# Patient Record
Sex: FEMALE | URBAN - METROPOLITAN AREA
[De-identification: names, ages, dates, MRNs, and addresses within clinical notes are randomized per-mention and may not be internally consistent; named-entity substitution may affect disease eponyms.]

---

## 2021-12-06 PROBLEM — I10 HYPERTENSION: Status: ACTIVE | Noted: 2021-12-06

## 2021-12-06 PROBLEM — H91.93 BILATERAL HEARING LOSS: Status: ACTIVE | Noted: 2021-12-06

## 2021-12-06 PROBLEM — G31.84 MILD COGNITIVE IMPAIRMENT: Status: ACTIVE | Noted: 2021-12-06

## 2021-12-06 PROBLEM — E78.2 MIXED HYPERLIPIDEMIA: Status: ACTIVE | Noted: 2021-12-06

## 2021-12-06 PROBLEM — J45.909 ASTHMA: Status: ACTIVE | Noted: 2021-12-06

## 2021-12-06 PROBLEM — G47.33 OSA (OBSTRUCTIVE SLEEP APNEA): Status: ACTIVE | Noted: 2021-12-06

## 2021-12-06 PROBLEM — J44.9 COPD (CHRONIC OBSTRUCTIVE PULMONARY DISEASE) (HCC): Status: ACTIVE | Noted: 2021-12-06

## 2021-12-06 PROBLEM — K21.9 GERD (GASTROESOPHAGEAL REFLUX DISEASE): Status: ACTIVE | Noted: 2021-12-06

## 2021-12-07 ENCOUNTER — OFFICE VISIT (OUTPATIENT)
Dept: GERIATRIC MEDICINE | Age: 86
End: 2021-12-07
Payer: MEDICARE

## 2021-12-07 DIAGNOSIS — U07.1 COVID-19: Primary | ICD-10-CM

## 2021-12-07 DIAGNOSIS — I10 PRIMARY HYPERTENSION: ICD-10-CM

## 2021-12-07 DIAGNOSIS — G31.84 MILD COGNITIVE IMPAIRMENT: ICD-10-CM

## 2021-12-07 DIAGNOSIS — J42 CHRONIC BRONCHITIS, UNSPECIFIED CHRONIC BRONCHITIS TYPE (HCC): ICD-10-CM

## 2021-12-07 LAB
ALBUMIN SERPL-MCNC: 3.6 G/DL (ref 3.5–4.6)
ALP BLD-CCNC: 93 U/L (ref 40–130)
ALT SERPL-CCNC: 13 U/L (ref 0–33)
ANION GAP SERPL CALCULATED.3IONS-SCNC: 11 MEQ/L (ref 9–15)
AST SERPL-CCNC: 16 U/L (ref 0–35)
BILIRUB SERPL-MCNC: 0.9 MG/DL (ref 0.2–0.7)
BILIRUBIN DIRECT: <0.2 MG/DL (ref 0–0.4)
BILIRUBIN, INDIRECT: NORMAL MG/DL (ref 0–0.6)
BUN BLDV-MCNC: 15 MG/DL (ref 8–23)
C-REACTIVE PROTEIN: 9.7 MG/L (ref 0–5)
CALCIUM SERPL-MCNC: 9.1 MG/DL (ref 8.5–9.9)
CHLORIDE BLD-SCNC: 106 MEQ/L (ref 95–107)
CO2: 28 MEQ/L (ref 20–31)
CREAT SERPL-MCNC: 0.65 MG/DL (ref 0.5–0.9)
D DIMER: 1.04 MG/L FEU (ref 0–0.5)
GFR AFRICAN AMERICAN: >60
GFR NON-AFRICAN AMERICAN: >60
GLOBULIN: 2.9 G/DL (ref 2.3–3.5)
GLUCOSE BLD-MCNC: 81 MG/DL (ref 70–99)
INR BLD: 1.1
POTASSIUM SERPL-SCNC: 3.7 MEQ/L (ref 3.4–4.9)
PROCALCITONIN: 0.21 NG/ML (ref 0–0.15)
PROTHROMBIN TIME: 14.3 SEC (ref 12.3–14.9)
SODIUM BLD-SCNC: 145 MEQ/L (ref 135–144)
TOTAL CK: 23 U/L (ref 0–170)
TOTAL PROTEIN: 6.5 G/DL (ref 6.3–8)
TROPONIN: <0.01 NG/ML (ref 0–0.01)

## 2021-12-07 PROCEDURE — 1123F ACP DISCUSS/DSCN MKR DOCD: CPT | Performed by: INTERNAL MEDICINE

## 2021-12-07 PROCEDURE — G8484 FLU IMMUNIZE NO ADMIN: HCPCS | Performed by: INTERNAL MEDICINE

## 2021-12-07 PROCEDURE — 99305 1ST NF CARE MODERATE MDM 35: CPT | Performed by: INTERNAL MEDICINE

## 2021-12-08 LAB
ALBUMIN SERPL-MCNC: 3.4 G/DL (ref 3.5–4.6)
ALP BLD-CCNC: 83 U/L (ref 40–130)
ALT SERPL-CCNC: 14 U/L (ref 0–33)
ANION GAP SERPL CALCULATED.3IONS-SCNC: 13 MEQ/L (ref 9–15)
AST SERPL-CCNC: 19 U/L (ref 0–35)
BILIRUB SERPL-MCNC: 0.9 MG/DL (ref 0.2–0.7)
BILIRUBIN DIRECT: <0.2 MG/DL (ref 0–0.4)
BILIRUBIN, INDIRECT: ABNORMAL MG/DL (ref 0–0.6)
BUN BLDV-MCNC: 13 MG/DL (ref 8–23)
CALCIUM SERPL-MCNC: 8.8 MG/DL (ref 8.5–9.9)
CHLORIDE BLD-SCNC: 107 MEQ/L (ref 95–107)
CO2: 23 MEQ/L (ref 20–31)
CREAT SERPL-MCNC: 0.61 MG/DL (ref 0.5–0.9)
D DIMER: 0.94 MG/L FEU (ref 0–0.5)
GFR AFRICAN AMERICAN: >60
GFR NON-AFRICAN AMERICAN: >60
GLUCOSE BLD-MCNC: 125 MG/DL (ref 70–99)
INR BLD: 1.1
POTASSIUM SERPL-SCNC: 3.9 MEQ/L (ref 3.4–4.9)
PROTHROMBIN TIME: 14.1 SEC (ref 12.3–14.9)
SODIUM BLD-SCNC: 143 MEQ/L (ref 135–144)
TOTAL CK: 24 U/L (ref 0–170)
TOTAL PROTEIN: 6.2 G/DL (ref 6.3–8)
TROPONIN: <0.01 NG/ML (ref 0–0.01)

## 2021-12-09 ENCOUNTER — OFFICE VISIT (OUTPATIENT)
Dept: GERIATRIC MEDICINE | Age: 86
End: 2021-12-09
Payer: MEDICARE

## 2021-12-09 DIAGNOSIS — U07.1 COVID-19: Primary | ICD-10-CM

## 2021-12-09 LAB
ATYPICAL LYMPHOCYTE RELATIVE PERCENT: 3 %
BASOPHILS ABSOLUTE: 0 K/UL (ref 0–0.2)
BASOPHILS RELATIVE PERCENT: 1 %
BURR CELLS: ABNORMAL
EOSINOPHILS ABSOLUTE: 0.2 K/UL (ref 0–0.7)
EOSINOPHILS RELATIVE PERCENT: 7 %
FERRITIN: 77 UG/L (ref 13–150)
HCT VFR BLD CALC: 37.1 % (ref 37–47)
HEMOGLOBIN: 12.2 G/DL (ref 12–16)
LACTATE DEHYDROGENASE: 285 U/L (ref 135–214)
LACTATE DEHYDROGENASE: 402 U/L (ref 135–214)
LYMPHOCYTES ABSOLUTE: 0.6 K/UL (ref 1–4.8)
LYMPHOCYTES RELATIVE PERCENT: 14 %
MCH RBC QN AUTO: 25.9 PG (ref 27–31.3)
MCHC RBC AUTO-ENTMCNC: 32.9 % (ref 33–37)
MCV RBC AUTO: 78.8 FL (ref 82–100)
MONOCYTES ABSOLUTE: 0.2 K/UL (ref 0.2–0.8)
MONOCYTES RELATIVE PERCENT: 4.8 %
NEUTROPHILS ABSOLUTE: 2.3 K/UL (ref 1.4–6.5)
NEUTROPHILS RELATIVE PERCENT: 71 %
PDW BLD-RTO: 13.6 % (ref 11.5–14.5)
PLATELET # BLD: 196 K/UL (ref 130–400)
PLATELET SLIDE REVIEW: ADEQUATE
POIKILOCYTES: ABNORMAL
RBC # BLD: 4.71 M/UL (ref 4.2–5.4)
SEDIMENTATION RATE, ERYTHROCYTE: 10 MM (ref 0–30)
WBC # BLD: 3.3 K/UL (ref 4.8–10.8)

## 2021-12-09 PROCEDURE — 99308 SBSQ NF CARE LOW MDM 20: CPT | Performed by: INTERNAL MEDICINE

## 2021-12-09 PROCEDURE — G8484 FLU IMMUNIZE NO ADMIN: HCPCS | Performed by: INTERNAL MEDICINE

## 2021-12-09 PROCEDURE — 1123F ACP DISCUSS/DSCN MKR DOCD: CPT | Performed by: INTERNAL MEDICINE

## 2021-12-10 ENCOUNTER — OFFICE VISIT (OUTPATIENT)
Dept: GERIATRIC MEDICINE | Age: 86
End: 2021-12-10
Payer: MEDICARE

## 2021-12-10 DIAGNOSIS — U07.1 COVID: ICD-10-CM

## 2021-12-10 LAB — PROCALCITONIN: <0.07 NG/ML (ref 0–0.07)

## 2021-12-10 PROCEDURE — 99308 SBSQ NF CARE LOW MDM 20: CPT | Performed by: NURSE PRACTITIONER

## 2021-12-10 PROCEDURE — 1123F ACP DISCUSS/DSCN MKR DOCD: CPT | Performed by: NURSE PRACTITIONER

## 2021-12-10 PROCEDURE — G8484 FLU IMMUNIZE NO ADMIN: HCPCS | Performed by: NURSE PRACTITIONER

## 2021-12-13 LAB
REASON FOR REJECTION: NORMAL
REJECTED TEST: NORMAL

## 2022-01-06 NOTE — PROGRESS NOTES
Patient Name: Mustapha Worrell  YOB: 1932  Medical Record Number: [id number]              History of Present Illness: This patient seen or initial evaluation for testing positive or covid 19. She has been largely asymptomatic but with a minimal cough. [place] living [place] and relatively successful. She is currently on covid precautions and protocols and anticipate return of [place] soon. Review of Systems   Unable to perform ROS: Dementia   All other systems reviewed and are negative. Review of Systems: All 14 review of systems negative other than as stated above    Social History     Tobacco Use    Smoking status: Not on file    Smokeless tobacco: Not on file   Substance Use Topics    Alcohol use: Not on file    Drug use: Not on file         No past medical history on file. No past surgical history on file. No current outpatient medications on file prior to visit. No current facility-administered medications on file prior to visit. Not on File      No family history on file. Physical Exam:      Physical Exam  Vitals and nursing note reviewed. Constitutional:       Appearance: Normal appearance. She is normal weight. HENT:      Head: Atraumatic. Mouth/Throat:      Mouth: Mucous membranes are dry. Eyes:      Pupils: Pupils are equal, round, and reactive to light. Cardiovascular:      Rate and Rhythm: Normal rate and regular rhythm. Pulses: Normal pulses. Pulmonary:      Effort: Pulmonary effort is normal.   Abdominal:      General: Bowel sounds are normal.      Palpations: Abdomen is soft. Musculoskeletal:         General: No swelling. Normal range of motion. Cervical back: Neck supple. No tenderness. Skin:     General: Skin is warm. Neurological:      Mental Status: She is disoriented. Psychiatric:         Mood and Affect: Mood normal.         There were no vitals taken for this visit.       .   Lab Results Component Value Date    WBC 3.3 (L) 12/08/2021    HGB 12.2 12/08/2021    HCT 37.1 12/08/2021    MCV 78.8 (L) 12/08/2021     12/08/2021     Lab Results   Component Value Date     12/08/2021    K 3.9 12/08/2021     12/08/2021    CO2 23 12/08/2021    BUN 13 12/08/2021    CREATININE 0.61 12/08/2021    GLUCOSE 125 12/08/2021    CALCIUM 8.8 12/08/2021                ASSESSMENT:  Patient Active Problem List   Diagnosis    Hypertension    Mixed hyperlipidemia    Mild cognitive impairment    FRANSISCO (obstructive sleep apnea)    COPD (chronic obstructive pulmonary disease) (HCC)    Asthma    Bilateral hearing loss    GERD (gastroesophageal reflux disease)         PLAN:   Diagnosis Orders   1. COVID-19     2. Chronic bronchitis, unspecified chronic bronchitis type ([facility] [address]     3. Mild cognitive impairment     4. Primary hypertension         Continue universal precautions, course of Pt OT ST. Repeat cbc bmp penidng. Monitor respiratory status and need for 02 and steroids. Consider monoclonal antiboies.

## 2022-01-08 NOTE — PROGRESS NOTES
Subjective:     CC: COVID-19    HPI:  Errol Garcia is a 80 y.o. female was seen today for COVID 19 evaluation. Patient has been COVID 19 positive since 12/7. They were seen with full PPE and observing continued droplet precautions. Condition discussed with nursing staff and treatment reviewed. Recent bloodwork, chest x-ray and vital signs reviewed. Fever curve and oxygen demands reviewed. Nutritional status and intake reviewed. Patient is not demonstrating increased respiratory complaints at this time. Patient has not been febrile in the last 24 hours. Patient is able to feed themselves. Patient is demonstrating increased oxygen demand. No past medical history on file. No past surgical history on file. No family history on file. Social History     Socioeconomic History    Marital status: Single     Spouse name: Not on file    Number of children: Not on file    Years of education: Not on file    Highest education level: Not on file   Occupational History    Not on file   Tobacco Use    Smoking status: Not on file    Smokeless tobacco: Not on file   Substance and Sexual Activity    Alcohol use: Not on file    Drug use: Not on file    Sexual activity: Not on file   Other Topics Concern    Not on file   Social History Narrative    Not on file     Social Determinants of Health     Financial Resource Strain:     Difficulty of Paying Living Expenses: Not on file   Food Insecurity:     Worried About Running Out of Food in the Last Year: Not on file    Michael of Food in the Last Year: Not on file   Transportation Needs:     Lack of Transportation (Medical): Not on file    Lack of Transportation (Non-Medical):  Not on file   Physical Activity:     Days of Exercise per Week: Not on file    Minutes of Exercise per Session: Not on file   Stress:     Feeling of Stress : Not on file   Social Connections:     Frequency of Communication with Friends and Family: Not on file    Frequency of Social Gatherings with Friends and Family: Not on file    Attends Congregation Services: Not on file    Active Member of Clubs or Organizations: Not on file    Attends Club or Organization Meetings: Not on file    Marital Status: Not on file   Intimate Partner Violence:     Fear of Current or Ex-Partner: Not on file    Emotionally Abused: Not on file    Physically Abused: Not on file    Sexually Abused: Not on file   Housing Stability:     Unable to Pay for Housing in the Last Year: Not on file    Number of [place] in the Last Year: Not on file    Unstable Housing in the Last Year: Not on file     No current outpatient medications on file prior to visit. No current facility-administered medications on file prior to visit. Review of Systems   Unable to perform ROS: Mental status change   All other systems reviewed and are negative. Objective:     Physical Exam  Vitals and nursing note reviewed. Constitutional:       Appearance: Normal appearance. She is normal weight. HENT:      Head: Normocephalic. Mouth/Throat:      Mouth: Mucous membranes are dry. Eyes:      Extraocular Movements: Extraocular movements intact. Cardiovascular:      Rate and Rhythm: Normal rate. Pulses: Normal pulses. Heart sounds: Normal heart sounds. Pulmonary:      Effort: Pulmonary effort is normal.      Breath sounds: No wheezing. Abdominal:      General: Bowel sounds are normal.      Palpations: Abdomen is soft. Musculoskeletal:      Cervical back: Neck supple. Neurological:      Mental Status: Mental status is at baseline.    Psychiatric:         Mood and Affect: Mood normal.          .[facility]  Lab Results   Component Value Date    FERRITIN 77 12/07/2021     Lab Results   Component Value Date    WBC 3.3 (L) 12/08/2021    HGB 12.2 12/08/2021    HCT 37.1 12/08/2021    MCV 78.8 (L) 12/08/2021     12/08/2021     Lab Results   Component Value Date     12/08/2021    K 3.9 12/08/2021     12/08/2021    CO2 23 12/08/2021    BUN 13 12/08/2021    CREATININE 0.61 12/08/2021    GLUCOSE 125 12/08/2021    CALCIUM 8.8 12/08/2021      Lab Results   Component Value Date    CKTOTAL 24 12/08/2021    TROPONINI <0.010 12/08/2021     Lab Results   Component Value Date    CRP 9.7 (H) 12/07/2021      Lab Results   Component Value Date    DDIMER 0.94 ([facility]) 12/08/2021      Lab Results   Component Value Date    CKTOTAL 24 12/08/2021       Please refer to on site chart for most recent chest Xray result, reviewed at this time. Assessment & Plan:     Continue with current regimen of  Vitamin C 500mg PO BID  Vitamin D 1000 IU PO QD  Zinc 50 mg PO QD  Lovenox 30mg SQ BID    Patient does require Dexamethasone 6mg PO QD  Patient does not  require antibiotics for concomitant bacterial pneumonia. Patient does not qualify for monoclonal antibodies. Continue to monitor blood work:  Weekly D-Dimer, LDH, CRP, CPK, Procalcitonin, Ferritin, Troponin, CBC, BMP. Patient's case discussed with nursing staff and Code Status reviewed. Please note orders entered on site at facility after discussion with appropriate facility nursing/therapy/ / nutritional staff. Current longstanding medical problems and acute medical issues addressed with staff. Available data and data elements in on site paper chart reviewed and analyzed. Current external consultant notes reviewed in on site chart. Ordered laboratory testing and imaging will be reviewed when available.      Farzad Fitzgerald MD

## 2022-03-02 PROBLEM — U07.1 COVID: Status: ACTIVE | Noted: 2021-12-10

## 2022-03-03 NOTE — PROGRESS NOTES
Nursing Home:  [address] [place] [address]    The patient is being seen today for acute visit for:  Chief Complaint   Patient presents with    Positive For Covid-19         SUBJECTIVE: Being seen today for acute visit for Covid. FAMILY AND SOCIAL HISTORY:  Refer to H&P. No past medical history on file. MEDICATIONS AND ALLERGIES:  Reviewed on chart at nursing facility. No current outpatient medications on file prior to visit. No current facility-administered medications on file prior to visit. REVIEW OF SYSTEMS:  Resident complains of fatigue. She is sleeping at the time of my visit and cannot offer a coherent narrative. PHYSICAL EXAMINATION:  Most recent vital signs: Blood pressure 131/82, heart rate 65, temperature 98, respirations 18, pulse oxing 96% on oxygen. Constitutional:  Resident is a 80 y.o. female frail, sleeping but arousable, pleasant, and cooperative with exam.  In no apparent distress. Integument:  Pink, warm, dry. No visible rashes, bruises, or open areas. She does have chronic venous stasis changes to her bilateral lower extremities. HEENT:  Normocephalic, atraumatic. External ears appear to be within normal limits. She is hard of hearing. Conjunctivae pink. Sclerae nonicteric. Mucous membranes pink, moist.  No oropharyngeal exudate. Neck:  Supple. No cervical or clavicular lymphadenopathy. No masses noted. Cardiovascular:  Heart rate regular rate and rhythm. No murmurs, gallops, rubs noted. Respiratory:  Lung sounds Normal.  Respirations nonlabored. The patient is not using accessory muscles with breathing. Is pulse oxing 96% on oxygen. Abdomen:  Soft, nontender, nondistended, normoactive bowel sounds. No masses noted. Musculoskeletal: No muscle tenderness. No joint tenderness. PV:  Peripheral pulses present. She  does have nonpitting edema to her bilateral lower extremities. Psychological: Mood stable. Affect pleasant.  Speech normal rate and tone. ASSESSMENT AND PLAN:      Diagnosis Orders   1. COVID          1. Respiratory status is stable she has not had any toxic episodes or episodes of respiratory distress. No use of accessory muscles with breathing. Current pulse ox 96% on oxygen. I have reviewed this resident's medication and treatment plan as well as most recent lab work. No further changes will be made at this time. Return in about 1 week (around 12/17/2021), or if symptoms worsen or fail to improve. Please note this report is partially produced by using speech recognition hardware. It may contain errors related to the system, including grammar, punctuation and spelling as well as words and phrases that may seem inaccurate. For any questions or concerns feel free to contact me for clarification        Electronically Signed By: Josefina Montez. Rossy Butler CNP on 3/2/22   ______________________________  Josefina Montez.  Annie DELEON CNP